# Patient Record
Sex: FEMALE | URBAN - METROPOLITAN AREA
[De-identification: names, ages, dates, MRNs, and addresses within clinical notes are randomized per-mention and may not be internally consistent; named-entity substitution may affect disease eponyms.]

---

## 2020-03-17 ENCOUNTER — APPOINTMENT (RX ONLY)
Dept: URBAN - METROPOLITAN AREA CLINIC 34 | Facility: CLINIC | Age: 85
Setting detail: DERMATOLOGY
End: 2020-03-17

## 2020-03-17 DIAGNOSIS — L66.8 OTHER CICATRICIAL ALOPECIA: ICD-10-CM

## 2020-03-17 DIAGNOSIS — L66.12 FRONTAL FIBROSING ALOPECIA: ICD-10-CM

## 2020-03-17 PROCEDURE — ? COUNSELING

## 2020-03-17 PROCEDURE — ? ADDITIONAL NOTES

## 2020-03-17 PROCEDURE — ? TREATMENT REGIMEN

## 2020-03-17 PROCEDURE — 99202 OFFICE O/P NEW SF 15 MIN: CPT

## 2020-03-17 NOTE — HPI: HAIR LOSS
How Did The Hair Loss Occur?: gradual in onset
How Severe Is Your Hair Loss?: severe
Additional History: Pt stated it started when she was diagnosed with rheumatoid arthritis. She was prescribed a medication methotrexate.  And patient noticed her hair would fall out and it will grow back.  Pt is currently on simzia for her RA

## 2020-03-17 NOTE — PROCEDURE: ADDITIONAL NOTES
Additional Notes: Discussed areas where hair is lost, there is a 5% chance to see any hair growth. Can prevent hair loss as it tends to progress. Discussed can start topical medicine minoxidil and in an oil base once a day.\\n And to apply an inch of the hairline on a daily basis. Discussed an aggressive form of hair loss due to autoimmune conditions. Pt denies any tenderness or itching on the scalp. Advised if that happens to call us and let us know.
Detail Level: Simple